# Patient Record
Sex: MALE | Race: WHITE
[De-identification: names, ages, dates, MRNs, and addresses within clinical notes are randomized per-mention and may not be internally consistent; named-entity substitution may affect disease eponyms.]

---

## 2017-10-17 ENCOUNTER — HOSPITAL ENCOUNTER (EMERGENCY)
Dept: HOSPITAL 92 - SCSER | Age: 67
Discharge: HOME | End: 2017-10-17
Payer: MEDICARE

## 2017-10-17 DIAGNOSIS — Z79.899: ICD-10-CM

## 2017-10-17 DIAGNOSIS — E78.5: ICD-10-CM

## 2017-10-17 DIAGNOSIS — R91.8: ICD-10-CM

## 2017-10-17 DIAGNOSIS — J18.9: Primary | ICD-10-CM

## 2017-10-17 DIAGNOSIS — F17.210: ICD-10-CM

## 2017-10-17 DIAGNOSIS — I25.2: ICD-10-CM

## 2017-10-17 DIAGNOSIS — I25.10: ICD-10-CM

## 2017-10-17 DIAGNOSIS — I10: ICD-10-CM

## 2017-10-17 DIAGNOSIS — Z79.82: ICD-10-CM

## 2017-10-17 LAB
ALP SERPL-CCNC: 110 U/L (ref 40–150)
ALT SERPL W P-5'-P-CCNC: 17 U/L (ref 8–55)
ANION GAP SERPL CALC-SCNC: 13 MMOL/L (ref 10–20)
AST SERPL-CCNC: 16 U/L (ref 5–34)
BASOPHILS # BLD AUTO: 0.1 THOU/UL (ref 0–0.2)
BASOPHILS NFR BLD AUTO: 0.7 % (ref 0–1)
BILIRUB SERPL-MCNC: 0.5 MG/DL (ref 0.2–1.2)
BUN SERPL-MCNC: 10 MG/DL (ref 8.4–25.7)
CALCIUM SERPL-MCNC: 9.4 MG/DL (ref 7.8–10.44)
CHLORIDE SERPL-SCNC: 106 MMOL/L (ref 98–107)
CK SERPL-CCNC: 86 U/L (ref 30–200)
CO2 SERPL-SCNC: 26 MMOL/L (ref 23–31)
CREAT CL PREDICTED SERPL C-G-VRATE: 0 ML/MIN (ref 70–130)
EOSINOPHIL # BLD AUTO: 0.2 THOU/UL (ref 0–0.7)
EOSINOPHIL NFR BLD AUTO: 1.9 % (ref 0–10)
GLOBULIN SER CALC-MCNC: 3.3 G/DL (ref 2.4–3.5)
HCT VFR BLD CALC: 49.8 % (ref 42–52)
LIPASE SERPL-CCNC: 39 U/L (ref 8–78)
LYMPHOCYTES # BLD: 2.1 THOU/UL (ref 1.2–3.4)
LYMPHOCYTES NFR BLD AUTO: 16.7 % (ref 21–51)
MONOCYTES # BLD AUTO: 1 THOU/UL (ref 0.11–0.59)
MONOCYTES NFR BLD AUTO: 7.7 % (ref 0–10)
NEUTROPHILS # BLD AUTO: 9.3 THOU/UL (ref 1.4–6.5)
RBC # BLD AUTO: 5.16 MILL/UL (ref 4.7–6.1)
TROPONIN I SERPL DL<=0.01 NG/ML-MCNC: (no result) NG/ML (ref ?–0.03)
TROPONIN I SERPL DL<=0.01 NG/ML-MCNC: (no result) NG/ML (ref ?–0.03)
WBC # BLD AUTO: 12.7 THOU/UL (ref 4.8–10.8)

## 2017-10-17 PROCEDURE — 80053 COMPREHEN METABOLIC PANEL: CPT

## 2017-10-17 PROCEDURE — 82553 CREATINE MB FRACTION: CPT

## 2017-10-17 PROCEDURE — 93005 ELECTROCARDIOGRAM TRACING: CPT

## 2017-10-17 PROCEDURE — 82550 ASSAY OF CK (CPK): CPT

## 2017-10-17 PROCEDURE — 71010: CPT

## 2017-10-17 PROCEDURE — 83690 ASSAY OF LIPASE: CPT

## 2017-10-17 PROCEDURE — 84484 ASSAY OF TROPONIN QUANT: CPT

## 2017-10-17 PROCEDURE — 85025 COMPLETE CBC W/AUTO DIFF WBC: CPT

## 2017-10-17 PROCEDURE — 71275 CT ANGIOGRAPHY CHEST: CPT

## 2017-10-17 PROCEDURE — 85379 FIBRIN DEGRADATION QUANT: CPT

## 2017-10-17 NOTE — RAD
UPRIGHT PORTABLE CHEST ONE VIEW:

 

History: 

66-year-old male with sharp chest pain, awakening the patient up at 6:30. History of cardiac bypass.
 

 

Comparison: 

12-22-10

 

FINDINGS: 

Post underlying sternotomy. Left hemidiaphragm elevation. Minimal increased linear and interstitial 
markings bilaterally having more the appearance of some chronic change. No confluent pneumonia, over
t edema or pleural effusion. 

 

IMPRESSION: 

Minimal chronic linear and interstitial changes bilaterally. Mild left hemidiaphragm elevation. No c
onfluent pneumonia, overt edema, pleural effusion or other acute process. 

 

POS: OFF

## 2017-10-17 NOTE — CT
CT CHEST WITH 3D VOLUME RENDERING:

 

Date:  10/17/17 

 

INDICATION:

Elevated D-Dimer, chest pain. 

 

FINDINGS:

There is abnormal reticulonodular density (9.0 mm) of the right lung, predominantly at the right ham
g apex. There is patchy and interstitial subpleural opacification bilaterally. There is a round pulm
onary nodule at the posterior right lower lobe. 

 

No evidence of significant filling defect to indicate pulmonary embolus. 

 

There is multifocal hypoattenuation of the hepatic parenchyma, incompletely assessed. 

 

IMPRESSION: 

1.  No evidence of acute pulmonary embolus. 

 

2.  Abnormal reticular nodularity of the right upper lobe, as well as pulmonary nodule of the right 
lower lobe. Recommend pulmonary medicine consultation for further care. 

 

 

POS: [facility]

## 2017-10-31 ENCOUNTER — HOSPITAL ENCOUNTER (OUTPATIENT)
Dept: HOSPITAL 92 - PET | Age: 67
Discharge: HOME | End: 2017-10-31
Attending: INTERNAL MEDICINE
Payer: MEDICARE

## 2017-10-31 DIAGNOSIS — R91.1: Primary | ICD-10-CM

## 2017-10-31 DIAGNOSIS — R91.8: ICD-10-CM

## 2017-10-31 PROCEDURE — A9552 F18 FDG: HCPCS

## 2017-10-31 PROCEDURE — 78815 PET IMAGE W/CT SKULL-THIGH: CPT

## 2017-11-01 NOTE — PET
PET CT EVALUATION:

 

CLINICAL HISTORY:

Pulmonary nodule.

 

COMPARISON:

Reference made to CT exam from 10/17/2017.

 

RADIOPHARMACEUTICAL:

Fluorine-18 FDG 13.76 millicuries IV.

 

FINDINGS:

There is appropriate biodistribution of radiotracer activity.

 

With regard to the reticulonodular opacities of the right upper lobe, there is hypermetabolic activi
ty confirmed by PET imaging, with an SUV of 2.5.  The small, round nodule of the right lower lobe is
 not well depicted, morphologically, on the basis of this exam, with the nondiagnostic CT portion of
 the exam, although there is no localizable hypermetabolic activity confirmed, with an SUV of approx
imately 1.6.

 

Reticulonodularity of the right upper lobe is present with increased metabolic activity, although th
e separate punctate nodules are below threshold for PET resolution, although given the appearance an
d distribution, favor satellite nodules, related to the above described hypermetabolic activity of t
he more dominant right apical nodule.

 

There is vague activity of the right hilar region, conforming to vasculature, without discrete hyper
metabolic activity.  No well discerned enlarged lymph nodes of this region are seen, within the limi
tations of the exam.

 

No hypermetabolic mass or adenopathy of the abdomen or pelvis.  No discrete hypermetabolic osseous l
esions.

 

Noncontrast attenuation correction CT reveals evidence of prior sternotomy and CABG.  There is a hyp
odensity involving the lower pole right kidney.  Tiny hypoattenuation is also seen within the left k
idney, exophytic in morphology.  There is scattered vascular disease.  Bilateral fat-containing ingu
inal rings are present.  There is colonic diverticulosis.  Dense prostate calcification is present.

 

IMPRESSION:

1.  Hypermetabolic activity of right apical pulmonary nodularity is present, indicative of malignanc
y, given the morphologic features combined with the hypermetabolic activity.  There are several smal
l surrounding satellite nodules, below the threshold for PET resolution.

 

2.  The right lower lobe pulmonary nodule is not hypermetabolic.  Continued imaging followup is nece
ssary for further evaluation.

 

POS: [facility]

## 2018-02-13 ENCOUNTER — HOSPITAL ENCOUNTER (OUTPATIENT)
Dept: HOSPITAL 92 - BICCT | Age: 68
Discharge: HOME | End: 2018-02-13
Attending: INTERNAL MEDICINE
Payer: MEDICARE

## 2018-02-13 DIAGNOSIS — R91.1: Primary | ICD-10-CM

## 2018-02-13 PROCEDURE — 71250 CT THORAX DX C-: CPT

## 2018-09-05 ENCOUNTER — HOSPITAL ENCOUNTER (OUTPATIENT)
Dept: HOSPITAL 92 - BICCT | Age: 68
Discharge: HOME | End: 2018-09-05
Attending: INTERNAL MEDICINE
Payer: MEDICARE

## 2018-09-05 DIAGNOSIS — R91.1: Primary | ICD-10-CM

## 2018-09-05 PROCEDURE — 71250 CT THORAX DX C-: CPT

## 2019-03-06 ENCOUNTER — HOSPITAL ENCOUNTER (OUTPATIENT)
Dept: HOSPITAL 92 - BICCT | Age: 69
Discharge: HOME | End: 2019-03-06
Attending: INTERNAL MEDICINE
Payer: MEDICARE

## 2019-03-06 DIAGNOSIS — R91.8: Primary | ICD-10-CM

## 2019-03-06 DIAGNOSIS — K76.89: ICD-10-CM

## 2019-03-06 PROCEDURE — 71250 CT THORAX DX C-: CPT

## 2019-03-06 NOTE — CT
CT THORAX WITHOUT CONTRAST:

 

03/06/2019

 

HISTORY:

Follow up lung nodules.

 

COMPARISON:

Studies on 09/05/2018 and 10/17/2017.

 

FINDINGS:

The irregular nodular densities within the right upper lobe, with adjacent, much smaller areas of nod
ularity within the right lobe are again seen and are overall stable.  Exact measurements are difficul
t to determine due to slice selection between exams.  The surrounding smaller, subcentimeter satellit
e nodular densities are again seen and are stable, compared to prior studies in 2018 and 2017.  There
 are stable multiple linear densities also again present within the right upper lobe, with areas of m
inimal bronchiectasis also again seen involving the right upper lobe.

 

There is a stable 11 mm pulmonary nodule at the posterolateral right lung base, measuring 11 mm.  No 
additional pulmonary nodule is seen.

 

Again noted is mild subpleural reticulation, predominantly involving the upper lobes.  No pleural eff
usion is seen.

 

Lack of intravenous contrast limits evaluation of vascular structures, as well as mediastinal structu
res.  However, no enlarged lymph nodes are seen by CT size criteria.

 

Post surgical changes related to CABG are noted.  Vascular calcifications are again seen in the coron
mino arteries and, to a lesser extent, involving the thoracic aorta.

 

Small, hypodense lesions in the left hepatic lobe are again seen and are unchanged in size, with at l
east three small hypodensities seen, the largest in the lateral segment, left hepatic lobe, measuring
 12 mm, which is unchanged in size.  No other interval change.  No lytic or sclerotic osseous lesions
 are appreciated, although there is what appears to be a small bone island in the L1 vertebral body.

 

IMPRESSION:

1.  Overall, stable appearance of the chest, with stable irregular, nodular densities within the righ
t upper lobe, with multiple adjacent satellite nodules, which are stable in size with stable bronchie
ctasis and stable linear interstitial densities.

 

2.  Stable right lower lobe pulmonary nodule.

 

3.  Stable hypodensities within the left hepatic lobe.

 

4.  Prominent vascular calcifications in the coronary arteries.

 

POS: [facility]

## 2019-09-10 ENCOUNTER — HOSPITAL ENCOUNTER (OUTPATIENT)
Dept: HOSPITAL 92 - BICCT | Age: 69
Discharge: HOME | End: 2019-09-10
Attending: INTERNAL MEDICINE
Payer: MEDICARE

## 2019-09-10 DIAGNOSIS — R91.1: Primary | ICD-10-CM

## 2019-09-10 PROCEDURE — 71250 CT THORAX DX C-: CPT

## 2019-09-10 NOTE — CT
CT Chest WO Con



History: R 91.1 pulmonary nodule



Comparison: CT chest March 6, 2019



Findings: Moderate background paraseptal and centrilobular emphysema. Scarring and bronchiectasis in 
the right upper lobe is similar. Right lower lobe nodule is unchanged. No new suspicious pulmonary

nodule.



No pneumothorax. No effusion.



No pericardial effusion. Limited evaluation of the upper abdomen demonstrates multiple hepatic hypode
nsities which appear similar. No mediastinal adenopathy. No axillary adenopathy.



No thoracic spine compression deformity.



Multiple benign foci of sclerosis of the ribs.



Impression: 

1. Unchanged appearance of the right upper lobe bronchiectasis and scarring.

2. Likely granuloma right lung base which has not increased in size.



Reported By: Maehndra Limon 

Electronically Signed:  9/10/2019 11:14 AM

## 2020-11-18 ENCOUNTER — HOSPITAL ENCOUNTER (OUTPATIENT)
Dept: HOSPITAL 92 - BICRAD | Age: 70
Discharge: HOME | End: 2020-11-18
Attending: NURSE PRACTITIONER
Payer: MEDICARE

## 2020-11-18 DIAGNOSIS — S49.91XA: Primary | ICD-10-CM

## 2020-11-18 NOTE — RAD
RIGHT SHOUDLER 3 VIEWS:

 

HISTORY: 

Shoulder pain.

 

FINDINGS: 

No evidence of fracture or dislocation.  AC joint normally aligned.

 

There is nodular opacity in the right apex which is best appreciated on recent CT chest of 7/8/2020.

 

IMPRESSION: 

No acute osseous abnormality identified.

 

POS: [facility]

## 2021-05-28 ENCOUNTER — HOSPITAL ENCOUNTER (OUTPATIENT)
Dept: HOSPITAL 92 - BICMRI | Age: 71
Discharge: HOME | End: 2021-05-28
Attending: NURSE PRACTITIONER
Payer: MEDICARE

## 2021-05-28 DIAGNOSIS — M77.8: ICD-10-CM

## 2021-05-28 DIAGNOSIS — S49.91XD: Primary | ICD-10-CM

## 2021-05-28 DIAGNOSIS — M81.0: ICD-10-CM

## 2021-07-07 ENCOUNTER — HOSPITAL ENCOUNTER (OUTPATIENT)
Dept: HOSPITAL 92 - BICCT | Age: 71
Discharge: HOME | End: 2021-07-07
Attending: INTERNAL MEDICINE
Payer: MEDICARE

## 2021-07-07 DIAGNOSIS — K76.9: ICD-10-CM

## 2021-07-07 DIAGNOSIS — R91.8: Primary | ICD-10-CM

## 2021-07-07 DIAGNOSIS — J43.9: ICD-10-CM

## 2021-07-07 PROCEDURE — 71250 CT THORAX DX C-: CPT

## 2022-04-09 ENCOUNTER — HOSPITAL ENCOUNTER (EMERGENCY)
Dept: HOSPITAL 92 - ERS | Age: 72
Discharge: HOME | End: 2022-04-09
Payer: MEDICARE

## 2022-04-09 DIAGNOSIS — I25.10: ICD-10-CM

## 2022-04-09 DIAGNOSIS — K40.90: Primary | ICD-10-CM

## 2022-04-09 DIAGNOSIS — E78.5: ICD-10-CM

## 2022-04-09 DIAGNOSIS — I10: ICD-10-CM

## 2022-04-09 DIAGNOSIS — F17.210: ICD-10-CM

## 2022-04-09 PROCEDURE — 99283 EMERGENCY DEPT VISIT LOW MDM: CPT

## 2022-04-22 ENCOUNTER — HOSPITAL ENCOUNTER (OUTPATIENT)
Dept: HOSPITAL 92 - LABBT | Age: 72
Discharge: HOME | End: 2022-04-22
Attending: SURGERY
Payer: MEDICARE

## 2022-04-22 DIAGNOSIS — K40.20: ICD-10-CM

## 2022-04-22 DIAGNOSIS — Z20.822: ICD-10-CM

## 2022-04-22 DIAGNOSIS — Z01.818: Primary | ICD-10-CM

## 2022-04-22 LAB
ANION GAP SERPL CALC-SCNC: 13 MMOL/L (ref 10–20)
BASOPHILS # BLD AUTO: 0.1 10X3/UL (ref 0–0.2)
BASOPHILS NFR BLD AUTO: 0.6 % (ref 0–2)
BUN SERPL-MCNC: 13 MG/DL (ref 8.4–25.7)
CALCIUM SERPL-MCNC: 9.5 MG/DL (ref 7.8–10.44)
CHLORIDE SERPL-SCNC: 105 MMOL/L (ref 98–107)
CO2 SERPL-SCNC: 27 MMOL/L (ref 23–31)
CREAT CL PREDICTED SERPL C-G-VRATE: 0 ML/MIN (ref 70–130)
EOSINOPHIL # BLD AUTO: 0.3 10X3/UL (ref 0–0.5)
EOSINOPHIL NFR BLD AUTO: 2.6 % (ref 0–6)
GLUCOSE SERPL-MCNC: 90 MG/DL (ref 83–110)
HGB BLD-MCNC: 15.4 G/DL (ref 13.5–17.5)
LYMPHOCYTES NFR BLD AUTO: 25.9 % (ref 18–47)
MCH RBC QN AUTO: 31.4 PG (ref 27–33)
MCV RBC AUTO: 94.5 FL (ref 81.2–95.1)
MONOCYTES # BLD AUTO: 1 10X3/UL (ref 0–1.1)
MONOCYTES NFR BLD AUTO: 9.9 % (ref 0–10)
NEUTROPHILS # BLD AUTO: 6 10X3/UL (ref 1.5–8.4)
NEUTROPHILS NFR BLD AUTO: 60.5 % (ref 40–75)
PLATELET # BLD AUTO: 258 10X3/UL (ref 150–450)
POTASSIUM SERPL-SCNC: 5 MMOL/L (ref 3.5–5.1)
RBC # BLD AUTO: 4.91 10X6/UL (ref 4.32–5.72)
SODIUM SERPL-SCNC: 140 MMOL/L (ref 136–145)
WBC # BLD AUTO: 9.9 10X3/UL (ref 3.5–10.5)

## 2022-04-22 PROCEDURE — 85025 COMPLETE CBC W/AUTO DIFF WBC: CPT

## 2022-04-22 PROCEDURE — U0005 INFEC AGEN DETEC AMPLI PROBE: HCPCS

## 2022-04-22 PROCEDURE — U0003 INFECTIOUS AGENT DETECTION BY NUCLEIC ACID (DNA OR RNA); SEVERE ACUTE RESPIRATORY SYNDROME CORONAVIRUS 2 (SARS-COV-2) (CORONAVIRUS DISEASE [COVID-19]), AMPLIFIED PROBE TECHNIQUE, MAKING USE OF HIGH THROUGHPUT TECHNOLOGIES AS DESCRIBED BY CMS-2020-01-R: HCPCS

## 2022-04-22 PROCEDURE — 93010 ELECTROCARDIOGRAM REPORT: CPT

## 2022-04-22 PROCEDURE — 80048 BASIC METABOLIC PNL TOTAL CA: CPT

## 2022-04-22 PROCEDURE — 93005 ELECTROCARDIOGRAM TRACING: CPT

## 2022-04-27 ENCOUNTER — HOSPITAL ENCOUNTER (OUTPATIENT)
Dept: HOSPITAL 92 - SDC | Age: 72
Discharge: HOME | End: 2022-04-27
Attending: SURGERY
Payer: MEDICARE

## 2022-04-27 VITALS — BODY MASS INDEX: 27.2 KG/M2

## 2022-04-27 DIAGNOSIS — Z53.09: ICD-10-CM

## 2022-04-27 DIAGNOSIS — I25.10: ICD-10-CM

## 2022-04-27 DIAGNOSIS — F17.210: ICD-10-CM

## 2022-04-27 DIAGNOSIS — Z79.899: ICD-10-CM

## 2022-04-27 DIAGNOSIS — K42.9: ICD-10-CM

## 2022-04-27 DIAGNOSIS — K40.20: Primary | ICD-10-CM

## 2022-04-27 DIAGNOSIS — I50.9: ICD-10-CM

## 2022-04-27 DIAGNOSIS — Z79.82: ICD-10-CM

## 2022-04-27 DIAGNOSIS — I42.9: ICD-10-CM

## 2022-06-20 ENCOUNTER — HOSPITAL ENCOUNTER (OUTPATIENT)
Dept: HOSPITAL 92 - LABBT | Age: 72
Discharge: HOME | End: 2022-06-20
Attending: SURGERY
Payer: MEDICARE

## 2022-06-20 DIAGNOSIS — Z01.812: Primary | ICD-10-CM

## 2022-06-20 DIAGNOSIS — K42.9: ICD-10-CM

## 2022-06-20 DIAGNOSIS — Z20.822: ICD-10-CM

## 2022-06-20 DIAGNOSIS — K40.20: ICD-10-CM

## 2022-06-20 LAB
ANION GAP SERPL CALC-SCNC: 16 MMOL/L (ref 10–20)
BASOPHILS # BLD AUTO: 0 10X3/UL (ref 0–0.2)
BASOPHILS NFR BLD AUTO: 0.4 % (ref 0–2)
BUN SERPL-MCNC: 9 MG/DL (ref 8.4–25.7)
CALCIUM SERPL-MCNC: 9.7 MG/DL (ref 7.8–10.44)
CHLORIDE SERPL-SCNC: 103 MMOL/L (ref 98–107)
CO2 SERPL-SCNC: 26 MMOL/L (ref 23–31)
CREAT CL PREDICTED SERPL C-G-VRATE: 0 ML/MIN (ref 70–130)
EOSINOPHIL # BLD AUTO: 0.1 10X3/UL (ref 0–0.5)
EOSINOPHIL NFR BLD AUTO: 1.4 % (ref 0–6)
GLUCOSE SERPL-MCNC: 92 MG/DL (ref 83–110)
HGB BLD-MCNC: 15 G/DL (ref 13.5–17.5)
LYMPHOCYTES NFR BLD AUTO: 20.3 % (ref 18–47)
MCH RBC QN AUTO: 31.8 PG (ref 27–33)
MCV RBC AUTO: 94.7 FL (ref 81.2–95.1)
MONOCYTES # BLD AUTO: 0.6 10X3/UL (ref 0–1.1)
MONOCYTES NFR BLD AUTO: 7.6 % (ref 0–10)
NEUTROPHILS # BLD AUTO: 5.5 10X3/UL (ref 1.5–8.4)
NEUTROPHILS NFR BLD AUTO: 69.8 % (ref 40–75)
PLATELET # BLD AUTO: 226 10X3/UL (ref 150–450)
POTASSIUM SERPL-SCNC: 4.5 MMOL/L (ref 3.5–5.1)
RBC # BLD AUTO: 4.72 10X6/UL (ref 4.32–5.72)
SODIUM SERPL-SCNC: 140 MMOL/L (ref 136–145)
WBC # BLD AUTO: 7.9 10X3/UL (ref 3.5–10.5)

## 2022-06-20 PROCEDURE — 80048 BASIC METABOLIC PNL TOTAL CA: CPT

## 2022-06-20 PROCEDURE — 85025 COMPLETE CBC W/AUTO DIFF WBC: CPT

## 2022-06-20 PROCEDURE — U0005 INFEC AGEN DETEC AMPLI PROBE: HCPCS

## 2022-06-20 PROCEDURE — U0003 INFECTIOUS AGENT DETECTION BY NUCLEIC ACID (DNA OR RNA); SEVERE ACUTE RESPIRATORY SYNDROME CORONAVIRUS 2 (SARS-COV-2) (CORONAVIRUS DISEASE [COVID-19]), AMPLIFIED PROBE TECHNIQUE, MAKING USE OF HIGH THROUGHPUT TECHNOLOGIES AS DESCRIBED BY CMS-2020-01-R: HCPCS

## 2022-06-23 ENCOUNTER — HOSPITAL ENCOUNTER (OUTPATIENT)
Dept: HOSPITAL 92 - SDC | Age: 72
Discharge: HOME | End: 2022-06-23
Attending: SURGERY
Payer: MEDICARE

## 2022-06-23 VITALS — BODY MASS INDEX: 27.5 KG/M2

## 2022-06-23 DIAGNOSIS — K40.20: Primary | ICD-10-CM

## 2022-06-23 DIAGNOSIS — Z95.1: ICD-10-CM

## 2022-06-23 DIAGNOSIS — Z79.82: ICD-10-CM

## 2022-06-23 DIAGNOSIS — K42.9: ICD-10-CM

## 2022-06-23 DIAGNOSIS — Z79.899: ICD-10-CM

## 2022-06-23 PROCEDURE — C1781 MESH (IMPLANTABLE): HCPCS

## 2022-06-23 PROCEDURE — 8E0W4CZ ROBOTIC ASSISTED PROCEDURE OF TRUNK REGION, PERCUTANEOUS ENDOSCOPIC APPROACH: ICD-10-PCS | Performed by: SURGERY

## 2022-06-23 PROCEDURE — 49585: CPT

## 2022-06-23 PROCEDURE — 0YUA4JZ SUPPLEMENT BILATERAL INGUINAL REGION WITH SYNTHETIC SUBSTITUTE, PERCUTANEOUS ENDOSCOPIC APPROACH: ICD-10-PCS | Performed by: SURGERY

## 2022-06-23 PROCEDURE — C1889 IMPLANT/INSERT DEVICE, NOC: HCPCS

## 2022-06-23 PROCEDURE — 49650 LAP ING HERNIA REPAIR INIT: CPT

## 2022-06-23 PROCEDURE — S0020 INJECTION, BUPIVICAINE HYDRO: HCPCS

## 2022-06-23 PROCEDURE — 0WUF0JZ SUPPLEMENT ABDOMINAL WALL WITH SYNTHETIC SUBSTITUTE, OPEN APPROACH: ICD-10-PCS | Performed by: SURGERY

## 2022-06-24 ENCOUNTER — HOSPITAL ENCOUNTER (EMERGENCY)
Dept: HOSPITAL 92 - ERS | Age: 72
Discharge: HOME | End: 2022-06-24
Payer: MEDICARE

## 2022-06-24 DIAGNOSIS — F17.210: ICD-10-CM

## 2022-06-24 DIAGNOSIS — E78.5: ICD-10-CM

## 2022-06-24 DIAGNOSIS — I25.10: ICD-10-CM

## 2022-06-24 DIAGNOSIS — R33.9: Primary | ICD-10-CM

## 2022-06-24 DIAGNOSIS — Z87.19: ICD-10-CM

## 2022-06-24 DIAGNOSIS — Z46.6: ICD-10-CM

## 2022-06-24 DIAGNOSIS — I10: ICD-10-CM

## 2022-06-24 LAB
RBC UR QL AUTO: (no result) HPF (ref 0–3)
SP GR UR STRIP: 1 (ref 1–1.04)

## 2022-06-24 PROCEDURE — 81003 URINALYSIS AUTO W/O SCOPE: CPT

## 2022-06-24 PROCEDURE — 81015 MICROSCOPIC EXAM OF URINE: CPT

## 2022-06-24 PROCEDURE — 87086 URINE CULTURE/COLONY COUNT: CPT

## 2022-06-24 PROCEDURE — 51702 INSERT TEMP BLADDER CATH: CPT

## 2022-07-26 ENCOUNTER — HOSPITAL ENCOUNTER (OUTPATIENT)
Dept: HOSPITAL 92 - CT | Age: 72
Discharge: HOME | End: 2022-07-26
Attending: INTERNAL MEDICINE
Payer: MEDICARE

## 2022-07-26 DIAGNOSIS — J64: ICD-10-CM

## 2022-07-26 DIAGNOSIS — J44.9: ICD-10-CM

## 2022-07-26 DIAGNOSIS — R91.8: Primary | ICD-10-CM

## 2022-07-26 PROCEDURE — 71250 CT THORAX DX C-: CPT

## 2023-07-26 ENCOUNTER — HOSPITAL ENCOUNTER (OUTPATIENT)
Dept: HOSPITAL 92 - BICCT | Age: 73
Discharge: HOME | End: 2023-07-26
Attending: INTERNAL MEDICINE
Payer: MEDICARE

## 2023-07-26 DIAGNOSIS — R91.8: Primary | ICD-10-CM

## 2023-07-26 DIAGNOSIS — S22.31XD: ICD-10-CM

## 2023-07-26 DIAGNOSIS — J98.4: ICD-10-CM

## 2023-07-26 DIAGNOSIS — R91.1: ICD-10-CM

## 2023-07-26 PROCEDURE — 71250 CT THORAX DX C-: CPT

## 2025-07-31 ENCOUNTER — HOSPITAL ENCOUNTER (OUTPATIENT)
Dept: HOSPITAL 92 - BICCT | Age: 75
Discharge: HOME | End: 2025-07-31
Attending: INTERNAL MEDICINE
Payer: MEDICARE

## 2025-07-31 DIAGNOSIS — R91.8: Primary | ICD-10-CM

## 2025-07-31 PROCEDURE — 71250 CT THORAX DX C-: CPT
